# Patient Record
Sex: MALE | Race: OTHER
[De-identification: names, ages, dates, MRNs, and addresses within clinical notes are randomized per-mention and may not be internally consistent; named-entity substitution may affect disease eponyms.]

---

## 2020-03-15 ENCOUNTER — HOSPITAL ENCOUNTER (EMERGENCY)
Dept: HOSPITAL 62 - ER | Age: 20
Discharge: HOME | End: 2020-03-15
Payer: SELF-PAY

## 2020-03-15 VITALS — DIASTOLIC BLOOD PRESSURE: 77 MMHG | SYSTOLIC BLOOD PRESSURE: 140 MMHG

## 2020-03-15 DIAGNOSIS — L03.116: Primary | ICD-10-CM

## 2020-03-15 PROCEDURE — 99283 EMERGENCY DEPT VISIT LOW MDM: CPT

## 2020-03-15 NOTE — ER DOCUMENT REPORT
HPI





- HPI


Patient complains to provider of: left lower leg redness


Time Seen by Provider: 03/15/20 12:40


Onset: Yesterday


Onset/Duration: Sudden


Quality of pain: Achy


Pain Level: 3


Context: 





19-year-old male with no prior history presents to the emergency department with

complaints of left lower leg chin pain.  Reports symptoms started yesterday.  

Erythema today.  Reports he had chills but denies fever vomiting diarrhea.  

Denies trauma to the area.  Reports he just woke up it was hurting.  Patient is 

a .  Denies trauma.  Patient did go to immediate care urgent care prior 

to arrival here the emergency department he is carrying a piece of paper that 

notes easy bruising bleeding fatigue.  Patient's friend is with him and he does 

not know why they wrote that on the paper.  He reports he did not have the 

symptoms.  He reports he did ask about diabetes because he is a family history 

of diabetes.  Patient speaks primarily Bulgarian.


Associated Symptoms: Chills


Exacerbated by: Denies


Relieved by: Denies


Similar symptoms previously: No


Recently seen / treated by doctor: No





- CONSTITUTIONAL


Constitutional: REPORTS: Fever, Chills





Past Medical History





- General


Information source: Patient





- Social History


Smoking Status: Never Smoker


Frequency of alcohol use: None


Drug Abuse: None


Family History: DM


Patient has suicidal ideation: No


Patient has homicidal ideation: No





- Medical History


Medical History: Negative


Surgical Hx: Negative





Vertical Provider Document





- CONSTITUTIONAL


Agree With Documented VS: Yes


Exam Limitations: No Limitations


General Appearance: WD/WN, No Apparent Distress





- INFECTION CONTROL


TRAVEL OUTSIDE OF THE [place] IN LAST 30 DAYS: No





- HEENT


HEENT: Atraumatic, Normocephalic





- NECK


Neck: Supple





- RESPIRATORY


Respiratory: Breath Sounds Normal, No Respiratory Distress





- CARDIOVASCULAR


Cardiovascular: Regular Rate





- MUSCULOSKELETAL/EXTREMETIES


Musculoskeletal/Extremeties: MAEW, FROM, Tender - left shin ttp with erythema, 

warmth, no pustule, no induration no fluctuance, pedal pulse +3 cap refill less 

than 2 seconds, denies calf pain





- NEURO


Level of Consciousness: Awake, Alert, Appropriate





- DERM


Integumentary: Warm, Dry


Adult Front & Back Diagram: 


                            __________________________














                            __________________________





 1 - Erythema noted approximately 6 cm in length 4 cm in width area circled.  No

pustule no induration noted








Course





- Re-evaluation


Re-evalutation: 





03/15/20 13:07


19-year-old male presents with left lower leg shin pain with erythema.  She 

speaks primarily Bulgarian.  He did go to urgent care prior to arrival here.  They

sent him over to the emergency department with notes that he is fatigued easily 

bruising and bleeding.  Patient is being interpreted to his friend.  We did 

obtain [facility].  Patient reports to Nandini that he only has pain to his leg.  He 

does not know why they wrote easy bleeding bruising and fatigue.  He reports he 

did ask about diabetes because he has a family history of diabetes but he denies

all the other symptoms.  Reports pain to the left lower leg chin area and also 

reports he had chills yesterday.  Patient was instructed on Keflex and ibuprofen

for the pain.  The area of erythema was marked circled with a surgical marker.  

He was instructed to return to the emergency department should this area become 

wider larger increased pain increased redness.  He reports he does have somebody

at home that can read English.  Patient discharged home.





- Vital Signs


Vital signs: 


                                        











Temp Pulse Resp BP Pulse Ox


 


 98.8 F   69   16   140/77 H  97 


 


 03/15/20 12:42  03/15/20 12:42  03/15/20 12:42  03/15/20 12:42  03/15/20 12:42














Discharge





- Discharge


Clinical Impression: 


 cellulitis left shin





Condition: Stable


Disposition: HOME, SELF-CARE


Instructions:  Cellulitis ([facility]), Cephalexin ([facility]), Family Physicians / 

Practices, Ibuprofen (General) ([facility])


Additional Instructions: 


*You have been treated for cellulitis Left Lemon


*Take medication as prescribed


*Monitor the site for signs of increasing infection such as increasing pain, 

redness, swelling, warmth


*Keep the site clean


*Follow up with a primary care provider within one week for recheck


*Return to ED for signs of increasing infection, worsening condition, changes, 

needs, increasing redness, increasing pain swelling or warmth














Monitor your blood pressure. Your blood pressure was elevated today.  This may 

be because you were anxious, in pain or because you need medication.  It is 

important to follow up with your primary care provider for full evaluation.


Prescriptions: 


Cephalexin Monohydrate [Keflex 500 mg Capsule] 500 mg PO QID #20 capsule


Ibuprofen [Motrin 800 mg Tablet] 800 mg PO TID #15 tablet


Forms:  Elevated Blood Pressure